# Patient Record
(demographics unavailable — no encounter records)

---

## 2024-11-01 NOTE — HISTORY OF PRESENT ILLNESS
[FreeTextEntry1] : Yogi Bradley is a 72-year-old man who is seen for recurrent prostate cancer on intermittent androgen deprivation therapy and erectile dysfunction.  He has not had a chance to use the sildenafil recently.  There is no weight loss or bone pain.  In April 2024 PSA level was 2.98 and in September 2023 was 1.38.  He is repeating it today.  Nocturia is 1 time.  Residual urine volume was minimal.  Hot flashes have improved.  The last Eligard injection was given in September 2021 to complete one year of androgen deprivation therapy.   Previous notes: PSA level increased to 10.2 in January 2021. He underwent CT and bone scans in August 2020 which were essentially benign. Axumin PET scan done in Nov 2020 the results of which are indicated below.  He is on Vitamin D and calcium. He has noticed curvature of the penis downward about 30. It does not seem to be interfering with erections or intercourse. There is no history of trauma to the penis.   Abnormal Axumin-PET/CT scan. 1. Increased, heterogeneous radiotracer activity in the prostate gland is suspicious for recurrent disease. 2. Several small Axumin avid retroperitoneal lymph nodes are suspicious for metastatic disease. 3. Several small, mildly avid lymph nodes in the bilateral inguinal regions are nonspecific.   He started Lupron and Casodex in July 2017 and continued for 6 months. He completed radiation therapy in 2018. Bone scan in 2017 showed left 8th rib trauma. CT in 2017 showed mild para-aortic adenopathy below the renal hilum and along the left common iliac chain. Biopsy showed high volume Mary 8 prostate cancer. PSA was 14.

## 2024-11-01 NOTE — PHYSICAL EXAM
[Urethral Meatus] : meatus normal [Penis Abnormality] : normal uncircumcised penis [Urinary Bladder Findings] : the bladder was normal on palpation [Scrotum] : the scrotum was normal [Testes Mass (___cm)] : there were no testicular masses [General Appearance - Well Developed] : well developed [FreeTextEntry1] : Exam done previously, no penile plaques, vitiligo of the foreskin [General Appearance - Well Nourished] : well nourished [Normal Appearance] : normal appearance [Well Groomed] : well groomed [] : no respiratory distress [Exaggerated Use Of Accessory Muscles For Inspiration] : no accessory muscle use [Normal Station and Gait] : the gait and station were normal for the patient's age [Oriented To Time, Place, And Person] : oriented to person, place, and time [Affect] : the affect was normal [Mood] : the mood was normal [Not Anxious] : not anxious

## 2024-11-01 NOTE — LETTER BODY
[Dear  ___] : Dear  [unfilled], [Attached please find my note.] : Attached please find my note. [Thank you very much for allowing me to participate in the care of this patient. If you have any questions, please do not hesitate to contact me] : Thank you very much for allowing me to participate in the care of this patient. If you have any questions, please do not hesitate to contact me. [FreeTextEntry1] :   Address: [phone] [place] [address]     \par  Phone: [phone]

## 2024-11-01 NOTE — ASSESSMENT
[FreeTextEntry1] : His exam is unchanged.  He is emptying his bladder well.  He has not had a chance to use the sildenafil.  PSA level will be repeated.  PSA doubling time was discussed.  Pending PSA results, most likely will proceed with PSMA PET scan to rule out metastatic disease once again.  Again restarting androgen deprivation therapy was discussed with him.  Aquiles Fields MD, FACS [facility]  of Urology  [address], [address] [place] [address]  Tel: [phone] Fax: [phone]

## 2025-01-31 NOTE — PHYSICAL EXAM
[Urethral Meatus] : meatus normal [Penis Abnormality] : normal uncircumcised penis [Urinary Bladder Findings] : the bladder was normal on palpation [Scrotum] : the scrotum was normal [Testes Mass (___cm)] : there were no testicular masses [FreeTextEntry1] : Exam done previously, foreskin vitiligo [General Appearance - Well Developed] : well developed [General Appearance - Well Nourished] : well nourished [Normal Appearance] : normal appearance [Well Groomed] : well groomed [] : no respiratory distress [Exaggerated Use Of Accessory Muscles For Inspiration] : no accessory muscle use [Normal Station and Gait] : the gait and station were normal for the patient's age [Oriented To Time, Place, And Person] : oriented to person, place, and time [Affect] : the affect was normal [Mood] : the mood was normal [Not Anxious] : not anxious

## 2025-01-31 NOTE — ASSESSMENT
[FreeTextEntry1] : He will start vitamin D and calcium.  Eligard 45 mg, 6-month injection was given on the right side.  Side effects discussed in detail.  PSMA PET scan results was reviewed in detail.  He will see medical oncology for further assessment of potential metastasis.  He will follow-up with me in 6 months.  Aquiles Fields MD, FACS [facility]  of Urology  [address] [place] [address], [address] [place] [address]  Tel: [phone] Fax: [phone]

## 2025-01-31 NOTE — HISTORY OF PRESENT ILLNESS
[FreeTextEntry1] : Yogi Bradley is a 73-year-old man who is seen for recurrent prostate cancer on intermittent androgen deprivation therapy and erectile dysfunction.  He is starting Eligard injections today which should be continuous.  PSA level was 5.09 in November 2024.  PSMA PET scan in December 2024 showed apparent mediastinal lymph node and a few tiny left lung nodules which were concerning for new metastasis.  He is supposed to see medical oncology as well.  PET scan report is indicated below.  There is no weight loss or bone pain.  In April 2024 PSA level was 2.98 and in September 2023 was 1.38.  The last Eligard injection was given in September 2021 to complete one year of androgen deprivation therapy.   Previous notes: PSA level increased to 10.2 in January 2021. He underwent CT and bone scans in August 2020 which were essentially benign. Axumin PET scan done in Nov 2020 the results of which are indicated below.  He is on Vitamin D and calcium. He has noticed curvature of the penis downward about 30. It does not seem to be interfering with erections or intercourse. There is no history of trauma to the penis.   PET scan from December 2024:  1. Compared with F-18 fluciclovine PET/CT 11/6/2020, there is a new PSMA positive upper mediastinal lymph node as well as a few tiny left lung nodules that are unable to be definitively correlated on nonbreath held CT. These are concerning for new metastases. Consider diagnostic quality CT for further evaluation. 2. Several PSMA avid retroperitoneal lymph nodes are redemonstrated, and better delineated on today's PET/CT. At least one index lymph node shows interval increase in size. 3. Solitary lucent and sclerotic PSMA avid left eighth rib lesion, with stable CT findings evident back to 5/19/2017. This suggests benign fibro-osseous lesion. No additional PSMA positive bone lesions that would be concerning for metastases.  Axumin-PET/CT scan. 1. Increased, heterogeneous radiotracer activity in the prostate gland is suspicious for recurrent disease. 2. Several small Axumin avid retroperitoneal lymph nodes are suspicious for metastatic disease. 3. Several small, mildly avid lymph nodes in the bilateral inguinal regions are nonspecific.   He started Lupron and Casodex in July 2017 and continued for 6 months. He completed radiation therapy in 2018. Bone scan in 2017 showed left 8th rib trauma. CT in 2017 showed mild para-aortic adenopathy below the renal hilum and along the left common iliac chain. Biopsy showed high volume [place] 8 prostate cancer. PSA was 14.

## 2025-01-31 NOTE — LETTER BODY
[Dear  ___] : Dear  [unfilled], [Attached please find my note.] : Attached please find my note. [Thank you very much for allowing me to participate in the care of this patient. If you have any questions, please do not hesitate to contact me] : Thank you very much for allowing me to participate in the care of this patient. If you have any questions, please do not hesitate to contact me. [FreeTextEntry1] : Address: [address] [place] [address] Phone: [phone]

## 2025-07-17 NOTE — HISTORY OF PRESENT ILLNESS
[FreeTextEntry1] : Yogi Bradley is a 73-year-old man who is seen for recurrent prostate cancer on androgen deprivation therapy and erectile dysfunction.  He is receiving Eligard injection today.  PSA was 0.23 in May 2025.  He was given sildenafil at the last visit.  He has not made appointment to see medical oncology yet.  He wants to try sildenafil again.  Androgen deprivation therapy was started in January 2025. PSA was 8.7 in January 2025. There is no weight loss or bone pain.  Previous notes: PSMA PET scan in December 2024 showed apparent mediastinal lymph node and a few tiny left lung nodules which were concerning for new metastasis. PET scan report is indicated below. The last Eligard injection was given in September 2021 to complete one year of androgen deprivation therapy.   PSA level increased to 10.2 in January 2021. He underwent CT and bone scans in August 2020 which were essentially benign. Axumin PET scan done in Nov 2020 the results of which are indicated below.  He is on Vitamin D and calcium. He has noticed curvature of the penis downward about 30. It does not seem to be interfering with erections or intercourse. There is no history of trauma to the penis.   PET scan from December 2024:  1. Compared with F-18 fluciclovine PET/CT 11/6/2020, there is a new PSMA positive upper mediastinal lymph node as well as a few tiny left lung nodules that are unable to be definitively correlated on nonbreath held CT. These are concerning for new metastases. Consider diagnostic quality CT for further evaluation. 2. Several PSMA avid retroperitoneal lymph nodes are redemonstrated, and better delineated on today's PET/CT. At least one index lymph node shows interval increase in size. 3. Solitary lucent and sclerotic PSMA avid left eighth rib lesion, with stable CT findings evident back to 5/19/2017. This suggests benign fibro-osseous lesion. No additional PSMA positive bone lesions that would be concerning for metastases.  Axumin-PET/CT scan. 1. Increased, heterogeneous radiotracer activity in the prostate gland is suspicious for recurrent disease. 2. Several small Axumin avid retroperitoneal lymph nodes are suspicious for metastatic disease. 3. Several small, mildly avid lymph nodes in the bilateral inguinal regions are nonspecific.   He started Lupron and Casodex in July 2017 and continued for 6 months. He completed radiation therapy in 2018. Bone scan in 2017 showed left 8th rib trauma. CT in 2017 showed mild para-aortic adenopathy below the renal hilum and along the left common iliac chain. Biopsy showed high volume Mary 8 prostate cancer. PSA was 14.

## 2025-07-17 NOTE — ASSESSMENT
[FreeTextEntry1] : He does not have significant voiding symptoms.  He has sildenafil if needed.  Eligard 30 mg, 4-month injection was given on the left side.  PSA level decreased as expected.  He was encouraged to contact medical oncology for further evaluation given potential lymph node metastasis.  Also message was sent to medical oncology department to contact him if possible for an appointment.  He will follow-up in 4 months for the next injection.  Continue vitamin D, calcium and weightbearing exercises.  Aquiles Fields MD, FACS [facility]  of Urology  [address] [place] [address], [place] [address]  Tel: [phone] Fax: [phone]

## 2025-07-17 NOTE — PHYSICAL EXAM
[Urethral Meatus] : meatus normal [Penis Abnormality] : normal uncircumcised penis [Urinary Bladder Findings] : the bladder was normal on palpation [Scrotum] : the scrotum was normal [Testes Mass (___cm)] : there were no testicular masses [FreeTextEntry1] : Genital exam was done previously.  Foreskin vitiligo  [General Appearance - Well Developed] : well developed [General Appearance - Well Nourished] : well nourished [Normal Appearance] : normal appearance [Well Groomed] : well groomed [] : no respiratory distress [Exaggerated Use Of Accessory Muscles For Inspiration] : no accessory muscle use [Abdomen Soft] : soft [Normal Station and Gait] : the gait and station were normal for the patient's age [Oriented To Time, Place, And Person] : oriented to person, place, and time [Affect] : the affect was normal [Mood] : the mood was normal [Not Anxious] : not anxious

## 2025-07-17 NOTE — LETTER BODY
[Dear  ___] : Dear  [unfilled], [Attached please find my note.] : Attached please find my note. [Thank you very much for allowing me to participate in the care of this patient. If you have any questions, please do not hesitate to contact me] : Thank you very much for allowing me to participate in the care of this patient. If you have any questions, please do not hesitate to contact me. [FreeTextEntry1] : Address: [address] [place] [address] Phone: [phone]

## 2025-07-26 NOTE — PHYSICAL EXAM
[Fully active, able to carry on all pre-disease performance without restriction] : Status 0 - Fully active, able to carry on all pre-disease performance without restriction [Normal] : normoactive bowel sounds, soft and nontender, no hepatosplenomegaly or masses appreciated [de-identified] : 1+ BL pedal edema

## 2025-07-26 NOTE — HISTORY OF PRESENT ILLNESS
[N: ___] : N[unfilled] [M: ___] : M[unfilled] [AJCC Stage: ____] : AJCC Stage: [unfilled] [de-identified] : 72 yo M w/ Stage IV Prostate cancer s/p XRT c/b ED, currently on ADT here for further management.  Chronologic cancer history: 4/2017: prostate biopsy showed  high risk Prostate adenocarcinoma ([place] 4+4=8),5/6 cores positive, upto 90% of core involved, grade group 4,   6/2017: Bone scan showed left 8th rib trauma. CT abd showed mild para-aortic adenopathy below the renal hilum and along the left common iliac chain.  7/2017:  started Lupron and Casodex  and continued for 6 months. He completed radiation therapy in 2018.  8/2020: rising PSA status post radiation therapy for prostate cancer. Bone scan and CT of abdomen and pelvis  showed no evidence of metastatic disease. PET/CT is done as part of the subsequent treatment strategy evaluation. 1/2021: PSA rising to 10.2 8/2021: PET CT showed increased heterogeneity in prostate c/f recurrent disease and avid RP LN c/f metastatic disease.  1/2021-12/2022: completed 1 year of Lupron 11/2024: PSA leona to 5.09 12/2024:PSMA PET w/ new PSMA positive upper mediastinal lymph node as well as a few tiny left lung nodules concerning for new metastases. Several PSMA avid retroperitoneal lymph nodes are redemonstrated, at least one index lymph node shows interval increase in size. 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 No additional PSMA positive bone lesions that would be concerning for metastases.  1/2025: ADT was restarted 5/2025: Received Lupron Q4mo by Urology   PSA  7/2017: 14 1/2021: 10.2 11/2024: 5.09 1/2025: 8.7 5/2025: 0.23  MHx: HTN, HLD FHx: none Soc Hx: works at [employer], lives alone, son lives close by, denies alcohol/smoking/ drugs [de-identified] : -has weight gain, sweating/hot flashes, gynecomastia on ADT  -Denies bone pain, back pain, HA, cough/SOB, dysuria, hematuria, fatigue -recently had a cataract surgery and doing well -on Centrum Men 50+ supplement multivitamin containing Ca/ VitD3

## 2025-07-26 NOTE — HISTORY OF PRESENT ILLNESS
[N: ___] : N[unfilled] [M: ___] : M[unfilled] [AJCC Stage: ____] : AJCC Stage: [unfilled] [de-identified] : 74 yo M w/ Stage IV Prostate cancer s/p XRT c/b ED, currently on ADT here for further management.  Chronologic cancer history: 4/2017: prostate biopsy showed  high risk Prostate adenocarcinoma ([place] 4+4=8),5/6 cores positive, upto 90% of core involved, grade group 4,   6/2017: Bone scan showed left 8th rib trauma. CT abd showed mild para-aortic adenopathy below the renal hilum and along the left common iliac chain.  7/2017:  started Lupron and Casodex  and continued for 6 months. He completed radiation therapy in 2018.  8/2020: rising PSA status post radiation therapy for prostate cancer. Bone scan and CT of abdomen and pelvis  showed no evidence of metastatic disease. PET/CT is done as part of the subsequent treatment strategy evaluation. 1/2021: PSA rising to 10.2 8/2021: PET CT showed increased heterogeneity in prostate c/f recurrent disease and avid RP LN c/f metastatic disease.  1/2021-12/2022: completed 1 year of Lupron 11/2024: PSA leona to 5.09 12/2024:PSMA PET w/ new PSMA positive upper mediastinal lymph node as well as a few tiny left lung nodules concerning for new metastases. Several PSMA avid retroperitoneal lymph nodes are redemonstrated, at least one index lymph node shows interval increase in size. 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 No additional PSMA positive bone lesions that would be concerning for metastases.  1/2025: ADT was restarted 5/2025: Received Lupron Q4mo by Urology   PSA  7/2017: 14 1/2021: 10.2 11/2024: 5.09 1/2025: 8.7 5/2025: 0.23  MHx: HTN, HLD FHx: none Soc Hx: works at [employer], lives alone, son lives close by, denies alcohol/smoking/ drugs [de-identified] : -has weight gain, sweating/hot flashes, gynecomastia on ADT  -Denies bone pain, back pain, HA, cough/SOB, dysuria, hematuria, fatigue -recently had a cataract surgery and doing well -on Centrum Men 50+ supplement multivitamin containing Ca/ VitD3

## 2025-07-26 NOTE — REVIEW OF SYSTEMS
[Negative] : Psychiatric [Fever] : no fever [Night Sweats] : no night sweats [Fatigue] : no fatigue [Incontinence] : no incontinence

## 2025-07-26 NOTE — ASSESSMENT
[FreeTextEntry1] : Mr. Bradley is a 73 years old male with history of prostate cancer diagnosed in 2017 because of elevated PSA, sofi 4+4 and mild para-aortic adenopathy s/p XRT with 6 months of ADT, completed in 2018. His PSA was undetectable in April 2018. Later his cancer recurred in 01/2021 with PSA 10.2, axumin scan showed again positive retroperitoneal nodes, restarted ADT, his PSA steven was 0.02 in March 2022, stopped 1 year. His PSA leona to 8.72 in Jan 31, 2025, restarted eligard on Jan 31, 2025, his PSA 0.27 on 5/7, 2025. PSMA PET in December 2024 showed positive mediastinal and retroperitoneal nodes and suspicious of lung nodules and bone   I had a lengthy discussion with the patient regarding his cancer status and further management. He has low volume (if lung nodules were not involved) or high volume (lung nodules were counted) metastatic hormone sensitive prostate cancer. However, his eligard was restarted on Jan 31, 2025, so he has been not added ARPI with or without docetaxel close to 6 months. The patient may not get the benefit by adding ARPIwith or without docetaxel now. He will continue to have only ADT and follows up with PSA, other labs. His many questions were answered in full to his satisfaction.   Plan:  -continue w/ ADT w/ Urology Q6mo -DEXA scan ordered (since on ADT) -Labs today: CBC, CMP, Testosterone, PSA, VitD3  -if patient is low on Ca/ VitD3 , we recommend starting VitD3 400mg daily + Ca 600mg daily -if DEXA shows osteoporosis, we will start on Xgeva -referred to genetic counselling given had hi risk prostate cancer RTC in 6 weeks

## 2025-07-26 NOTE — PHYSICAL EXAM
[Fully active, able to carry on all pre-disease performance without restriction] : Status 0 - Fully active, able to carry on all pre-disease performance without restriction [Normal] : normoactive bowel sounds, soft and nontender, no hepatosplenomegaly or masses appreciated [de-identified] : 1+ BL pedal edema